# Patient Record
Sex: MALE | Race: WHITE | NOT HISPANIC OR LATINO
[De-identification: names, ages, dates, MRNs, and addresses within clinical notes are randomized per-mention and may not be internally consistent; named-entity substitution may affect disease eponyms.]

---
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
## 2019-04-23 ENCOUNTER — RECORD ABSTRACTING (OUTPATIENT)
Age: 19
End: 2019-04-23

## 2019-04-23 PROBLEM — Z00.00 ENCOUNTER FOR PREVENTIVE HEALTH EXAMINATION: Status: ACTIVE | Noted: 2019-04-23

## 2022-06-10 ENCOUNTER — APPOINTMENT (OUTPATIENT)
Dept: OTOLARYNGOLOGY | Facility: CLINIC | Age: 22
End: 2022-06-10

## 2022-07-10 ENCOUNTER — EMERGENCY (EMERGENCY)
Facility: HOSPITAL | Age: 22
LOS: 0 days | Discharge: HOME | End: 2022-07-10
Attending: STUDENT IN AN ORGANIZED HEALTH CARE EDUCATION/TRAINING PROGRAM

## 2022-07-10 VITALS
SYSTOLIC BLOOD PRESSURE: 127 MMHG | OXYGEN SATURATION: 98 % | DIASTOLIC BLOOD PRESSURE: 84 MMHG | RESPIRATION RATE: 16 BRPM | HEART RATE: 94 BPM

## 2022-07-10 VITALS
TEMPERATURE: 98 F | SYSTOLIC BLOOD PRESSURE: 120 MMHG | OXYGEN SATURATION: 100 % | HEART RATE: 76 BPM | RESPIRATION RATE: 17 BRPM | DIASTOLIC BLOOD PRESSURE: 81 MMHG

## 2022-07-10 DIAGNOSIS — R04.0 EPISTAXIS: ICD-10-CM

## 2022-07-10 DIAGNOSIS — R45.6 VIOLENT BEHAVIOR: ICD-10-CM

## 2022-07-10 DIAGNOSIS — F43.25 ADJUSTMENT DISORDER WITH MIXED DISTURBANCE OF EMOTIONS AND CONDUCT: ICD-10-CM

## 2022-07-10 LAB
ALBUMIN SERPL ELPH-MCNC: 4.6 G/DL — SIGNIFICANT CHANGE UP (ref 3.3–5)
ALP SERPL-CCNC: 71 U/L — SIGNIFICANT CHANGE UP (ref 40–120)
ALT FLD-CCNC: 14 U/L — SIGNIFICANT CHANGE UP (ref 12–78)
AMPHET UR-MCNC: NEGATIVE — SIGNIFICANT CHANGE UP
ANION GAP SERPL CALC-SCNC: 9 MMOL/L — SIGNIFICANT CHANGE UP (ref 5–17)
APAP SERPL-MCNC: < 2 UG/ML (ref 10–30)
APTT BLD: 36.7 SEC — HIGH (ref 27.5–35.5)
AST SERPL-CCNC: 27 U/L — SIGNIFICANT CHANGE UP (ref 15–37)
BARBITURATES UR SCN-MCNC: NEGATIVE — SIGNIFICANT CHANGE UP
BASOPHILS # BLD AUTO: 0.03 K/UL — SIGNIFICANT CHANGE UP (ref 0–0.2)
BASOPHILS NFR BLD AUTO: 0.3 % — SIGNIFICANT CHANGE UP (ref 0–2)
BENZODIAZ UR-MCNC: NEGATIVE — SIGNIFICANT CHANGE UP
BILIRUB SERPL-MCNC: 0.8 MG/DL — SIGNIFICANT CHANGE UP (ref 0.2–1.2)
BUN SERPL-MCNC: 19 MG/DL — SIGNIFICANT CHANGE UP (ref 7–23)
CALCIUM SERPL-MCNC: 10.4 MG/DL — HIGH (ref 8.5–10.1)
CHLORIDE SERPL-SCNC: 108 MMOL/L — SIGNIFICANT CHANGE UP (ref 96–108)
CO2 SERPL-SCNC: 24 MMOL/L — SIGNIFICANT CHANGE UP (ref 22–31)
COCAINE METAB.OTHER UR-MCNC: NEGATIVE — SIGNIFICANT CHANGE UP
CREAT SERPL-MCNC: 1.04 MG/DL — SIGNIFICANT CHANGE UP (ref 0.5–1.3)
EGFR: 104 ML/MIN/1.73M2 — SIGNIFICANT CHANGE UP
EOSINOPHIL # BLD AUTO: 0.01 K/UL — SIGNIFICANT CHANGE UP (ref 0–0.5)
EOSINOPHIL NFR BLD AUTO: 0.1 % — SIGNIFICANT CHANGE UP (ref 0–6)
ETHANOL SERPL-MCNC: <10 MG/DL — SIGNIFICANT CHANGE UP (ref 0–10)
GLUCOSE SERPL-MCNC: 113 MG/DL — HIGH (ref 70–99)
HCT VFR BLD CALC: 45.7 % — SIGNIFICANT CHANGE UP (ref 39–50)
HGB BLD-MCNC: 15.8 G/DL — SIGNIFICANT CHANGE UP (ref 13–17)
IMM GRANULOCYTES NFR BLD AUTO: 0.2 % — SIGNIFICANT CHANGE UP (ref 0–1.5)
INR BLD: 1.22 RATIO — HIGH (ref 0.88–1.16)
LYMPHOCYTES # BLD AUTO: 1.85 K/UL — SIGNIFICANT CHANGE UP (ref 1–3.3)
LYMPHOCYTES # BLD AUTO: 20.7 % — SIGNIFICANT CHANGE UP (ref 13–44)
MCHC RBC-ENTMCNC: 28.8 PG — SIGNIFICANT CHANGE UP (ref 27–34)
MCHC RBC-ENTMCNC: 34.6 G/DL — SIGNIFICANT CHANGE UP (ref 32–36)
MCV RBC AUTO: 83.4 FL — SIGNIFICANT CHANGE UP (ref 80–100)
METHADONE UR-MCNC: NEGATIVE — SIGNIFICANT CHANGE UP
MONOCYTES # BLD AUTO: 0.8 K/UL — SIGNIFICANT CHANGE UP (ref 0–0.9)
MONOCYTES NFR BLD AUTO: 9 % — SIGNIFICANT CHANGE UP (ref 2–14)
NEUTROPHILS # BLD AUTO: 6.22 K/UL — SIGNIFICANT CHANGE UP (ref 1.8–7.4)
NEUTROPHILS NFR BLD AUTO: 69.7 % — SIGNIFICANT CHANGE UP (ref 43–77)
NRBC # BLD: 0 /100 WBCS — SIGNIFICANT CHANGE UP (ref 0–0)
OPIATES UR-MCNC: NEGATIVE — SIGNIFICANT CHANGE UP
PCP SPEC-MCNC: SIGNIFICANT CHANGE UP
PCP UR-MCNC: NEGATIVE — SIGNIFICANT CHANGE UP
PLATELET # BLD AUTO: 279 K/UL — SIGNIFICANT CHANGE UP (ref 150–400)
POTASSIUM SERPL-MCNC: 4.7 MMOL/L — SIGNIFICANT CHANGE UP (ref 3.5–5.3)
POTASSIUM SERPL-SCNC: 4.7 MMOL/L — SIGNIFICANT CHANGE UP (ref 3.5–5.3)
PROT SERPL-MCNC: 8.5 GM/DL — HIGH (ref 6–8.3)
PROTHROM AB SERPL-ACNC: 14.6 SEC — HIGH (ref 10.5–13.4)
RBC # BLD: 5.48 M/UL — SIGNIFICANT CHANGE UP (ref 4.2–5.8)
RBC # FLD: 11.9 % — SIGNIFICANT CHANGE UP (ref 10.3–14.5)
SALICYLATES SERPL-MCNC: <1.7 MG/DL (ref 2.8–20)
SODIUM SERPL-SCNC: 141 MMOL/L — SIGNIFICANT CHANGE UP (ref 135–145)
THC UR QL: NEGATIVE — SIGNIFICANT CHANGE UP
WBC # BLD: 8.93 K/UL — SIGNIFICANT CHANGE UP (ref 3.8–10.5)
WBC # FLD AUTO: 8.93 K/UL — SIGNIFICANT CHANGE UP (ref 3.8–10.5)

## 2022-07-10 PROCEDURE — 99284 EMERGENCY DEPT VISIT MOD MDM: CPT

## 2022-07-10 PROCEDURE — 90792 PSYCH DIAG EVAL W/MED SRVCS: CPT | Mod: 95

## 2022-07-10 NOTE — ED BEHAVIORAL HEALTH ASSESSMENT NOTE - DETAILS
n/a Maternal Aunt - history of suicide attempt; Maternal Uncle - unknown psychiatric illness; Maternal cousin -  by accidental Fentanyl overdose Nose bleeding Warning signs are existential thoughts, and pain. Coping skills are youtube, video games, talking to Mom Maternal Aunt - history of suicide attempt; Maternal Uncle - unknown psychiatric illness; Maternal cousin -  by accidental Fentanyl overdose; Sister - Depression Spoke with Mother about safety plan and outpatient resources

## 2022-07-10 NOTE — ED PROVIDER NOTE - PHYSICAL EXAMINATION
GENERAL: Awake, alert, NAD  HEENT: NC/AT, moist mucous membranes  LUNGS: CTAB, no wheezes or crackles   CARDIAC: RRR, no m/r/g  ABDOMEN: Soft, normal BS, non tender, non distended, no rebound, no guarding  BACK: No midline spinal tenderness, no CVA tenderness  EXT: No edema, no calf tenderness, 2+ DP pulses bilaterally, no deformities.  NEURO: A&Ox3. Moving all extremities.  SKIN: +pale. No rash.  PSYCH: Pacing, appears anxious, avoids eye contact GENERAL: Awake, alert, NAD  HEENT: NC/AT, moist mucous membranes, dried blood at R nare  LUNGS: CTAB, no wheezes or crackles   CARDIAC: RRR, no m/r/g  ABDOMEN: Soft, normal BS, non tender, non distended, no rebound, no guarding  BACK: No midline spinal tenderness, no CVA tenderness  EXT: No edema, no calf tenderness, 2+ DP pulses bilaterally, no deformities.  NEURO: A&Ox3. Moving all extremities.  SKIN: +pale. No rash.  PSYCH: Pacing, appears anxious, avoids eye contact

## 2022-07-10 NOTE — ED BEHAVIORAL HEALTH ASSESSMENT NOTE - HPI (INCLUDE ILLNESS QUALITY, SEVERITY, DURATION, TIMING, CONTEXT, MODIFYING FACTORS, ASSOCIATED SIGNS AND SYMPTOMS)
20 yo M, single, domiciled with mom, dad, 25 yo sister, unemployed, graduated high school, psychiatric history of brief outpatient mental health treatment in 9th grade, heavy cannabis use now sober since 2018, no PMHx other than epistaxis, who presents with his mother for epistaxis; mother reported patient made suicidal statement in car ride to the ER. Psychiatry consulted for safety assessment.    Patient reports that his nose started bleeding about two months ago with an episode every two weeks (that he associates with wearing a mask outside) up until last week when it started bleeding every day after he went to the doctor and got it cauterized. He says "I can't do anything, I can't sleep" reporting he fell asleep for an hour last night, woke up with blood gushing out of his nose, and rushed to the ER. He says "I was scared" on the car ride over and made the statement about jumping out of the car in frustration. He says he has existential thoughts about death, dying, and the meaning of life, but he is afraid of death and does not want to die. He says "I want my nose to stop bleeding because this is no way to live" and is angry that going to the doctor for a treatment made it worse.     He says he graduated high school, started smoking weed and using "wax" cannabis with his friends, got arrested with no shelter time due to having "wax" on him during a police car search, no shelter time, and he stopped using cannabis and hanging out with those friends in 2018. He reports never having had a job, spends his days waking up at noon, eating something, feeding his cat Virgil, going to his room and watching [employer] and playing video games, and has no friends. He reports being very scared of COVID, including fear of showering in case it would make his nosebleeds worse. He says he showers about once a week on average, and last showered Monday or Tuesday before his doctor's appointment. He says he feels depressed most of the time, is not interested in medications or mental health support, denies SI, HI, or auditory hallucinations. He says he is looking forward to watching [employer] on YouTube once the nosebleeding gets addressed. He says he talks to his family when he is upset or sad and his Mom is usually supportive.    Collateral: See  note by Tanisha Bella at 17:14. Additionally, Mom says that he stopped going outside altogether after COVID restrictions started and is only recently starting to go outside and then the nosebleeds started happening. Mom confirms that she has seen the nosebleeds. She reports he is obsessive about things that he is afraid of, and very anxious.  She says she was afraid when he made the suicidal statement in the car because her niece  recently of an accidental overdose on Fentanyl. She says she has no safety concerns but she is worried about him being depressed and wants him to get connected to mental health treatment. She says in addition to being in treatment in 9th grade, he had challenges early on in school with learning and needed extra help.

## 2022-07-10 NOTE — ED BEHAVIORAL HEALTH ASSESSMENT NOTE - SAFETY PLAN ADDT'L DETAILS
Safety plan discussed with.../Education provided regarding environmental safety / lethal means restriction/Provision of National Suicide Prevention Lifeline 0-077-052-FMEL (1635)

## 2022-07-10 NOTE — ED PROVIDER NOTE - CARE PROVIDER_API CALL
Jason Cornell)  Otolaryngology  [address], [place] [address]  Phone: [phone]  Fax: [phone]  Follow Up Time: 1-3 Days

## 2022-07-10 NOTE — ED ADULT NURSE REASSESSMENT NOTE - NS ED NURSE REASSESS COMMENT FT1
Received pt in stretcher A&Ox3. Pt on 1:1, PCA at side. Pt denies homicidal and suicidal ideation, auditory and visual hallucination. Observation ongoing.

## 2022-07-10 NOTE — ED ADULT NURSE REASSESSMENT NOTE - NS ED NURSE REASSESS COMMENT FT1
0730- Pt received from previous shift. All previous doc on paper 0730- Pt received from previous shift. All previous doc on paper. Safety maintained. PT ON 1;1 observation by Nsg staff at arm's length due to suicidal. Mother present at bedside. No acute distress, will cont' to monitor.

## 2022-07-10 NOTE — ED PROVIDER NOTE - NSFOLLOWUPINSTRUCTIONS_ED_ALL_ED_FT
Nosebleed, Adult      A nosebleed is when blood comes out of the nose. Nosebleeds are common. Usually, they are not a sign of a serious condition.    Nosebleeds can happen if a blood vessel in your nose starts to bleed or if the lining of your nose (mucous membrane) cracks. They are commonly caused by:  •Allergies.      •Colds.      •Picking your nose.      •Blowing your nose too hard.      •An injury from sticking an object into your nose or getting hit in the nose.      •Dry or cold air.      Less common causes of nosebleeds include:  •Toxic fumes.      •Something abnormal in the nose or in the air-filled spaces in the bones of the face (sinuses).      •Growths in the nose, such as polyps.      •Blood thinners or conditions that cause blood to clot slowly.      •Certain illnesses or procedures that irritate or dry out the nasal passages.        Follow these instructions at home:      When you have a nosebleed:      •Sit down and tilt your head slightly forward.      •Use a clean towel or tissue to pinch your nostrils under the bony part of your nose. After 5 minutes, let go of your nose and see if bleeding starts again. Do not release pressure before that time. If there is still bleeding, repeat the pinching and holding for 5 minutes or until the bleeding stops.      • Do not place tissues or gauze in the nose to stop the bleeding.      •Avoid lying down and avoid tilting your head backward. That may make blood collect in the throat and cause gagging or coughing.      •Use a nasal spray decongestant to help with a nosebleed as told by your health care provider.      After a nosebleed:     •Avoid blowing your nose or sniffing for a number of hours.      •Avoid straining, lifting, or bending at the waist for several days. You may go back to other normal activities as you are able.    •If you are taking aspirin or blood thinners and you have nosebleeds, talk to your health care provider. These medicines make bleeding more likely.  •Ask your health care provider if you should stop taking the medicines or if you should adjust the dose.      •Do not stop taking medicines that your health care provider has recommended unless he or she tells you to stop taking them.      •If your nosebleed was caused by dry mucous membranes, use over-the-counter saline nasal spray or gel and a humidifier as told by your health care provider. This will keep the mucous membranes moist and allow them to heal. If you need to use one of these products:  •Choose one that is water-soluble.      •Use only as much as you need and use it only as often as needed.      •Do not lie down right after you use it.      •If you get nosebleeds often, talk with your health care provider about medical treatments. Options may include:  •Nasal cautery. This treatment stops and prevents nosebleeds by using a chemical swab or electrical device to lightly burn tiny blood vessels inside the nose.      •Nasal packing. A gauze or other material is placed in the nose to keep constant pressure on the bleeding area.          Contact a health care provider if you:    •Have a fever.      •Get nosebleeds often or more often than usual.      •Bruise very easily.      •Have a nosebleed from having something stuck in your nose.      •Have bleeding in your mouth.      •Vomit or cough up brown material.      •Have a nosebleed after you start a new medicine.        Get help right away if:    •You have a nosebleed after a fall or a head injury.      •Your nosebleed does not go away after 20 minutes.      •You feel dizzy or weak.      •You have unusual bleeding from other parts of your body.      •You have unusual bruising on other parts of your body.      •You become sweaty.      •You vomit blood.        Summary    •A nosebleed is when blood comes out of the nose. Common causes include allergies, an injury to the nose, or cold or dry air.      •Initial treatment includes applying pressure for 5 minutes.       •Moisturizing the nose with saline nasal spray or gel after a nosebleed may help prevent future bleeding.      •Get help right away if your nosebleed does not go away after 20 minutes.      This information is not intended to replace advice given to you by your health care provider. Make sure you discuss any questions you have with your health care provider.

## 2022-07-10 NOTE — ED BEHAVIORAL HEALTH ASSESSMENT NOTE - SUMMARY
22 yo M, single, domiciled with mom, dad, 25 yo sister, unemployed, graduated high school, psychiatric history of brief outpatient mental health treatment in 9th grade, heavy cannabis use now sober since 2018, no PMHx other than epistaxis, who presents with his mother for epistaxis; mother reported patient made suicidal statement in car ride to the ER. Psychiatry consulted for safety assessment.    Mom reports early history of learning difficulties, the patient has no friends, has limited interests, poor executive functioning, concrete thought process, poor eye contact reported by in person staff, preoccupations with existential thoughts, agoraphobia, and very poor hygiene. His presentation is concerning for undiagnosed neurodevelopmental disorder, likely Autism, with depression and severe anxiety. Though he has negative symptoms of psychosis (isolation, poor hygiene) his thought process is linear and goal directed. He declines voluntary psychiatric admission for treatment of depressive symptoms and diagnostic clarification. The patient has no history of suicidal thoughts, self-directed violence, is future oriented, and reports fear of dying with no desire to die. He is able to engage in safety planning and does not appear to be at elevated risk of suicide or violence toward others at this time. He made a suicidal statement out of frustration in the setting of unresolved epistaxis lasting two months and Adjustment Disorder with mixed disturbance of emotions and conduct is most likely diagnosis.

## 2022-07-10 NOTE — ED PROVIDER NOTE - NS ED ROS FT
CONST: no fevers, no chills  EYES: no pain, no vision changes  ENT: no sore throat, no ear pain, no change in hearing  CV: no chest pain, no leg swelling  RESP: no shortness of breath, no cough  ABD: no abdominal pain, no nausea, no vomiting, no diarrhea  : no dysuria, no flank pain, no hematuria  MSK: no back pain, no extremity pain  NEURO: no headache or additional neurologic complaints  HEME: +nose bleeding  SKIN:  no rash

## 2022-07-10 NOTE — ED BEHAVIORAL HEALTH ASSESSMENT NOTE - RISK ASSESSMENT
Low Acute Suicide Risk Risk factors are mood disorder, severe anxiety, lack of connection to treatment, poor insight, acute unresolved medical issue of epistaxis    Protective factors are lack of access to lethal means, supportive family, fear of death, and future oriented thinking

## 2022-07-10 NOTE — ED PROVIDER NOTE - PATIENT PORTAL LINK FT
You can access the FollowMyHealth Patient Portal offered by [facility] by registering at the following website: [email or web address]. By joining [employer]’s FollowMyHealth portal, you will also be able to view your health information using other applications (apps) compatible with our system.

## 2022-07-10 NOTE — ED PROVIDER NOTE - CLINICAL SUMMARY MEDICAL DECISION MAKING FREE TEXT BOX
Seen by tele-psych otherwise pt not suicidal and otherwise given outpt psych resources and will dc with ENT follow up for epistaxis.

## 2022-07-10 NOTE — ED PROVIDER NOTE - PROGRESS NOTE DETAILS
Patient was triaged during downtime and did not appear on provider board. Therefore there was delay in seeing patient from triage.

## 2022-07-10 NOTE — ED BEHAVIORAL HEALTH NOTE - BEHAVIORAL HEALTH NOTE
===================  PRE-HOSPITAL COURSE  ===================  SOURCE:  ED RN and secondhand documentation  DETAILS:  Patient brought in by mother to ED c/o epistaxis; endorsing depressive symptoms.     ============  ED COURSE   ============  SOURCE: ED RN and secondhand documentation  ARRIVAL: Patient brought in by mother via private car   BELONGINGS: All belongings were provided to hospital security, and patient currently in a gown with a 1:1 staff member.  BEHAVIOR: Patient AAOx4 in the ED; remains on 1:1 for SI. Patient calm and cooperative; resting comfortably in the stretcher.   TREATMENT: Patient did not require PRNs in the ED currently.  VISITORS: Giselle at bedside.       Patient gives permission to obtain collateral from Mother-Whitney:  ( x ) Yes  (  )  No  Rationale for overriding objection           	 (  ) Lack of capacity. Details: ________           	 (  ) Assessing risk of danger to self/others. Details: ________      Rationale for selecting specific collateral source            		(  ) Potential to impact risk of danger to self/others and no alternative equivalent. Details: _____      ========================  FOR EACH COLLATERAL  ========================  NAME: Whitney Ardon   NUMBER: [phone]  RELATIONSHIP: Mother	  RELIABILITY: High   Collateral has requested that the information provided remain confidential: Yes [  ] No [ x ]  Collateral has provided information that patient is/may be unaware of: Yes [  ] No [ x ]    COMMENTS:  Patient is a 21-year-old male, single, domiciled with sister and parents, unemployed, non-caregiver, with no PMHx, with no formal past psychiatric illness history or prior psychiatric encounters, no past psychiatric admissions, no history of suicide attempts or non-suicidal self-injury, no known prior history of aggression or legal issues, who was brought into the ED by his mother for epistaxis who then endorsed SI and depressive symptoms. As per mother, patient has been having recurrent episodes of epistaxis that patient has not been able to control and that have required ED visits. Mother reports that patient has developed significant amount of anxiety about this and even fears leaving the house because he does not know if he will have another episode that he will not be able to control. Patient was seen by ENT last week Wednesday who attempted to cauterize the area, but as per mother this has worsened the patient’s episodes and symptoms. Mother reports that overall this has caused patient’s distrust of the medical community to deepen. As per mother, the overall anxiety surrounding patient’s episodes are affecting patient’s functionality. Mother reports patient is not sleeping due to fear that his nose will bleed, and he will be unable to stop. She reports patient has become “obsessed” and preoccupied with the fear of his nose bleeding and it not stopping.     As per mother, patient previously saw a psychiatrist (in Freshmen year of high school) and was taking low dose SSRI. Patient self-discontinued this and stopped seeing psychiatrist as he felt it was not helping. Patient not currently engaged in outpatient care with psychiatrist or therapist. Mother reports that today she was concerned for patient’s wellbeing as he was “panicking on the way to the ED- saying he was going to die at the hospital and kept putting his hand on the handle of the car door like he was going to open it while we were driving”. Mother feels that patient’s symptoms would improve overall if he were able to see an ENT and get his symptoms out of control. She does not feel patient needs an admission, but would benefit from outpatient supportive counseling.

## 2022-07-11 DIAGNOSIS — F43.25 ADJUSTMENT DISORDER WITH MIXED DISTURBANCE OF EMOTIONS AND CONDUCT: ICD-10-CM

## 2022-07-14 ENCOUNTER — APPOINTMENT (OUTPATIENT)
Dept: OTOLARYNGOLOGY | Facility: CLINIC | Age: 22
End: 2022-07-14

## 2022-09-20 ENCOUNTER — APPOINTMENT (OUTPATIENT)
Dept: OTOLARYNGOLOGY | Facility: CLINIC | Age: 22
End: 2022-09-20

## 2022-11-08 ENCOUNTER — APPOINTMENT (OUTPATIENT)
Dept: OTOLARYNGOLOGY | Facility: CLINIC | Age: 22
End: 2022-11-08

## 2022-12-29 ENCOUNTER — APPOINTMENT (OUTPATIENT)
Dept: OTOLARYNGOLOGY | Facility: CLINIC | Age: 22
End: 2022-12-29
